# Patient Record
Sex: MALE | Race: BLACK OR AFRICAN AMERICAN
[De-identification: names, ages, dates, MRNs, and addresses within clinical notes are randomized per-mention and may not be internally consistent; named-entity substitution may affect disease eponyms.]

---

## 2020-07-13 ENCOUNTER — HOSPITAL ENCOUNTER (OUTPATIENT)
Dept: HOSPITAL 62 - RAD | Age: 75
End: 2020-07-13
Attending: INTERNAL MEDICINE
Payer: MEDICARE

## 2020-07-13 DIAGNOSIS — N18.3: ICD-10-CM

## 2020-07-13 DIAGNOSIS — N28.1: Primary | ICD-10-CM

## 2020-07-13 PROCEDURE — 82565 ASSAY OF CREATININE: CPT

## 2020-07-13 PROCEDURE — 74181 MRI ABDOMEN W/O CONTRAST: CPT

## 2020-07-13 PROCEDURE — 74170 CT ABD WO CNTRST FLWD CNTRST: CPT

## 2020-07-13 NOTE — RADIOLOGY REPORT (SQ)
EXAM DESCRIPTION:  MRI ABDOMEN WITHOUT



IMAGES COMPLETED DATE/TIME:  7/13/2020 2:16 pm



REASON FOR STUDY:  (N18.3)CHRONIC KIDNEY DISEASE, STAGE 3 (MODERATE);(N28.1)RENAL CYST N28.1  CYST OF
 KIDNEY, ACQUIRED N18.3  CHRONIC KIDNEY DISEASE, STAGE 3 (MODERATE)



COMPARISON:  CT done earlier the same day.



TECHNIQUE:  Precontrast T1, T2, STIR, in and out of phase T1 sequences.



CONTRAST TYPE AND DOSE:  None.



RENAL FUNCTION:  Not indicated.  ACR Type II contrast agent associated with few, if any, unconfounded
 cases of NSF



LIMITATIONS:  None.



FINDINGS:  RIGHT KIDNEY: There are several simple right renal cyst demonstrating high signal intensit
y on T2 weighted sequences.  Decreased signal intensity on T1 weighted images.  In addition in the mi
d pole the right kidney is a multi-septated cystic lesion.  This measures approximately 4.3 cm in gre
atest diameter.  The calcifications demonstrated on CT are less apparent on MR imaging.  The septated
 are thin.

LEFT KIDNEY: Numerous simple left renal cysts.  There is a lesion off the anterior inferior pole left
 kidney measured at approximately 2 cm in diameter.  This is isointense with renal parenchyma on T1 a
nd T2 weighted sequences.  No significant change is signal on inphase are out of phase sequences.  No
 evidence of restricted diffusion.

OTHER SOLID ORGANS: No significant abnormality.

VASCULAR STRUCTURES: No aortic aneurysm. No dissection.

RETROPERITONEUM, PERITONEUM: No retroperitoneal adenopathy, hemorrhage or masses.

MARROW SIGNAL IN THE SPINE AND VISUALIZED PELVIS: Normal marrow signal. No marrow replacement.



IMPRESSION:  1.  Multiseptated right renal cyst.  Based on CT septae a contain calcifications.  Findi
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

2.  2 cm indeterminate lesion in the anterior inferior aspect of the left kidney.  This is isointense
 with renal parenchyma.  CT demonstrates no appreciable enhancement.  No calcifications.  Recommend u
ltrasound see if this represents cystic lesion is so no further workup is needed.  If it is solid it 
will need follow up.



TECHNICAL DOCUMENTATION:  JOB ID:  [id number]

 2011 [employer]- All Rights Reserved



Reading location - IP/workstation name: [email or web address]

## 2020-07-13 NOTE — RADIOLOGY REPORT (SQ)
EXAM DESCRIPTION:  CT ABDOMEN COMBO



IMAGES COMPLETED DATE/TIME:  7/13/2020 2:43 pm



REASON FOR STUDY:  (N28.1)CYST OF KIDNEY, ACQUIRED N28.1  CYST OF KIDNEY, ACQUIRED N18.3  CHRONIC KID
CHENTE DISEASE, STAGE 3 (MODERATE)



COMPARISON:  None.



TECHNIQUE:  3 phase CT scan limited views of the abdomen performed with and without intravenous contr
ast and without oral contrast.  Contrasted imaging performed using helical scanning technique with dy
namic intravenous contrast injection.  Thin sliced arterial and portal venous phase post contrast gloria
ges acquired.  Images reviewed with lung, soft tissue, and bone windows.  Reconstructed coronal and s
agittal MPR images reviewed.  Delayed images for evaluation of the urinary system also acquired and e
valuated.  All images stored on PACS.

All CT scanners at this facility use dose modulation, iterative reconstruction, and/or weight based d
osing when appropriate to reduce radiation dose to as low as reasonably achievable (ALARA).

[facility]: Dose Right  [facility]: CareDose    [facility]: Dose Right    [facility]: Teradose 4D    [facility]: Smart Technologies



CONTRAST TYPE AND DOSE:  contrast/concentration: Isovue 300.00 mmol/ml; Total Contrast Delivered: 100
.0 ml; Total Saline Delivered: 90.0 ml



RENAL FUNCTION:  Creatinine 1.6



RADIATION DOSE:  CT Rad equipment meets quality standard of care and radiation dose reduction techniq
ues were employed. CTDIvol: 18.4 - 28.2 mGy. DLP: 2954 mGy-cm. .



LIMITATIONS:  None.



FINDINGS:  NONCONTRASTED IMAGING: Multiple bilateral renal lesions.  Most appear to represent simple 
cysts with Hounsfield units ranging from 7 to 32 on the right.  On the left there is a 2.1 cm hyperat
tenuating lesion with Hounsfield units of 42 on precontrast images.  Probable small hemorrhagic cyst 
in the lower pole the right kidney anteriorly this measures only 5.8 mm in diameter.  This could repr
esent small calcifications as well.  These lesion is too small to accurately characterize.

POSTCONTRASTED IMAGING:

LOWER CHEST: No significant findings.  No nodules or infiltrates.

LIVER: No masses or dilated ducts in the visualized liver.

SPLEEN: No focal lesions in the visualized spleen.

PANCREAS: No masses.  No significant calcifications.  No adjacent inflammation or peripancreatic flui
d collections.  Pancreatic duct not dilated.

GALLBLADDER: No identified stones by CT criteria. No inflammatory changes to suggest cholecystitis.

ADRENAL GLANDS: No significant masses or asymmetry.

RIGHT KIDNEY AND URETER: The right kidney demonstrates multiple cysts.  There is a simple 3.9 cm cyst
 in the midpole with Hounsfield units of approximately 3.  No abnormal enhancement.  This is best dem
onstrated on delayed images series 9, image 70.  Just anterior to this lesion is a multi-septated cys
tic lesion.  This measures 3.5 x 4.1 cm in greatest diameter.  This demonstrates Hounsfield units whi
ch range from 3 the 32.  There is a small amount of calcification.  No abnormal enhancement.  There a
re additional smaller cortical lesions most likely cysts although too small to accurately characteriz
e.

LEFT KIDNEY AND URETER: On the left the hyperattenuating cortical lesion on precontrast studies demon
strates no abnormal enhancement.  Hounsfield is remain at approximately 35 to 40.  This measures 2 cm
 in greatest diameter.  This is most consistent with hemorrhagic cyst.  Smaller left cortical lesions
 are identified consistent with simple cysts.  The largest is in the upper pole and measures 2.2 cm. 
 This is best demonstrated on series 9, image 56.

AORTA AND VESSELS: Limited visualization without aneurysm.

RETROPERITONEUM: No retroperitoneal adenopathy, hemorrhage or masses.

BOWEL AND PERITONEAL CAVITY: Limited visualization.  No obvious masses or inflammatory changes.  Post
surgical changes are again noted.

ABDOMINAL WALL: No masses.  No hernias.

BONY STRUCTURES: No significant or acute findings in the visualized bony structures.

OTHER: No other significant finding.



IMPRESSION:  1.  Complex septated right renal cyst measured at 3.5 x 4.1 cm.  Thin septations with mi
nimal calcification.  No abnormal enhancement.  There are additional simple right renal cysts.

2.  Hyperattenuating cortical lesion off the anterior inferior aspect of the left kidney.  This most 
likely represents hemorrhagic cyst.  This measures 2 cm in diameter.  No abnormal enhancement.  There
 are additional hypoattenuating lesions most likely simple cysts as well.



TECHNICAL DOCUMENTATION:  JOB ID: [id number]

Quality ID # [id number]: Final reports with documentation of one or more dose reduction techniques (e.g., Au
tomated exposure control, adjustment of the mA and/or kV according to patient size, use of iterative 
reconstruction technique)

 2011 [employer]- All Rights Reserved



Reading location - IP/workstation name: [email or web address]

## 2020-09-02 ENCOUNTER — HOSPITAL ENCOUNTER (OUTPATIENT)
Dept: HOSPITAL 62 - RAD | Age: 75
End: 2020-09-02
Attending: INTERNAL MEDICINE
Payer: MEDICARE

## 2020-09-02 DIAGNOSIS — N28.1: Primary | ICD-10-CM

## 2020-09-02 PROCEDURE — 76770 US EXAM ABDO BACK WALL COMP: CPT

## 2020-09-02 NOTE — RADIOLOGY REPORT (SQ)
EXAM DESCRIPTION:  U/S RETROPERITON (RENAL/AORTA)



IMAGES COMPLETED DATE/TIME:  9/2/2020 11:34 am



REASON FOR STUDY:  N28.1 CYST OF KIDNEY, ACQUIRED, N28.9 DISORDER OF KIDNEY AND URETER, UNSPEC N28.9 
 DISORDER OF KIDNEY AND URETER, UNSPECIFIED N28.1  CYST OF KIDNEY, ACQUIRED



COMPARISON:  CT 7/13/2020



TECHNIQUE:  Dynamic and static grayscale images acquired of the kidneys and bladder and recorded on P
ACS. Additional selected color Doppler and spectral images recorded.



LIMITATIONS:  None.



FINDINGS:  RIGHT KIDNEY: Normal size 11.9 cm. Normal echogenicity.  Multiple cysts.  The largest [place]
ures 3.3 cm.  No solid or suspicious masses. No hydronephrosis. No calcifications.

LEFT KIDNEY:  Normal size 10.8 cm. Normal echogenicity.  A couple of cysts are present.  The largest 
measures 3 cm.  No solid or suspicious masses. No hydronephrosis. No calcifications.

BLADDER: There appears to be an indentation in the wall of the bladder on the right and posteriorly. 
 This may be extrinsic.  Cannot entirely rule out the presence of a mass.

OTHER FINDINGS: No other significant finding.



IMPRESSION:  1.  Bilateral renal cysts.

2.  Extrinsic indentation in the wall of the bladder versus a bladder mass.  This measures about 16 m
m.



TECHNICAL DOCUMENTATION:  JOB ID:  [id number]

 2011 [employer]- All Rights Reserved



Reading location - IP/workstation name: [email or web address]